# Patient Record
Sex: MALE | Race: BLACK OR AFRICAN AMERICAN | NOT HISPANIC OR LATINO | Employment: OTHER | URBAN - METROPOLITAN AREA
[De-identification: names, ages, dates, MRNs, and addresses within clinical notes are randomized per-mention and may not be internally consistent; named-entity substitution may affect disease eponyms.]

---

## 2019-01-01 ENCOUNTER — TELEPHONE (OUTPATIENT)
Dept: TRANSPLANT | Facility: CLINIC | Age: 54
End: 2019-01-01

## 2019-01-01 ENCOUNTER — HOSPITAL ENCOUNTER (OUTPATIENT)
Dept: RADIOLOGY | Facility: HOSPITAL | Age: 54
Discharge: HOME | End: 2019-08-28
Attending: NURSE PRACTITIONER
Payer: MEDICARE

## 2019-01-01 ENCOUNTER — OFFICE VISIT (OUTPATIENT)
Dept: TRANSPLANT | Facility: CLINIC | Age: 54
End: 2019-01-01
Payer: MEDICARE

## 2019-01-01 ENCOUNTER — CLINICAL SUPPORT (OUTPATIENT)
Dept: INFECTIOUS DISEASES | Facility: CLINIC | Age: 54
End: 2019-01-01
Payer: MEDICARE

## 2019-01-01 VITALS
HEART RATE: 76 BPM | HEIGHT: 72 IN | BODY MASS INDEX: 31.48 KG/M2 | RESPIRATION RATE: 18 BRPM | WEIGHT: 232.38 LBS | TEMPERATURE: 97 F | OXYGEN SATURATION: 100 % | SYSTOLIC BLOOD PRESSURE: 160 MMHG | DIASTOLIC BLOOD PRESSURE: 80 MMHG

## 2019-01-01 DIAGNOSIS — Z76.82 ORGAN TRANSPLANT CANDIDATE: ICD-10-CM

## 2019-01-01 DIAGNOSIS — E08.21 DIABETES MELLITUS DUE TO UNDERLYING CONDITION WITH DIABETIC NEPHROPATHY, UNSPECIFIED WHETHER LONG TERM INSULIN USE: ICD-10-CM

## 2019-01-01 DIAGNOSIS — Z87.39 HX OF OSTEOMYELITIS: ICD-10-CM

## 2019-01-01 DIAGNOSIS — N18.6 ANEMIA IN ESRD (END-STAGE RENAL DISEASE): ICD-10-CM

## 2019-01-01 DIAGNOSIS — Z99.2 ESRD ON HEMODIALYSIS: ICD-10-CM

## 2019-01-01 DIAGNOSIS — N18.6 ESRD ON HEMODIALYSIS: ICD-10-CM

## 2019-01-01 DIAGNOSIS — I73.9 PVD (PERIPHERAL VASCULAR DISEASE): ICD-10-CM

## 2019-01-01 DIAGNOSIS — D63.1 ANEMIA IN ESRD (END-STAGE RENAL DISEASE): ICD-10-CM

## 2019-01-01 DIAGNOSIS — Z01.818 PRE-TRANSPLANT EVALUATION FOR CHRONIC KIDNEY DISEASE: Primary | ICD-10-CM

## 2019-01-01 PROCEDURE — 76770 US RETROPERITONEAL COMPLETE: ICD-10-PCS | Mod: 26,TXP,, | Performed by: RADIOLOGY

## 2019-01-01 PROCEDURE — 99999 PR PBB SHADOW E&M-EST. PATIENT-LVL III: CPT | Mod: PBBFAC,TXP,, | Performed by: NURSE PRACTITIONER

## 2019-01-01 PROCEDURE — 99244 PR OFFICE CONSULTATION,LEVEL IV: ICD-10-PCS | Mod: S$GLB,TXP,, | Performed by: SURGERY

## 2019-01-01 PROCEDURE — 71046 X-RAY EXAM CHEST 2 VIEWS: CPT | Mod: TC,TXP

## 2019-01-01 PROCEDURE — 93978 US DOPP ILIACS BILATERAL: ICD-10-PCS | Mod: 26,TXP,, | Performed by: RADIOLOGY

## 2019-01-01 PROCEDURE — 3008F BODY MASS INDEX DOCD: CPT | Mod: CPTII,S$GLB,TXP, | Performed by: NURSE PRACTITIONER

## 2019-01-01 PROCEDURE — 90471 IMMUNIZATION ADMIN: CPT | Mod: S$GLB,TXP,, | Performed by: PHYSICIAN ASSISTANT

## 2019-01-01 PROCEDURE — 3008F PR BODY MASS INDEX (BMI) DOCUMENTED: ICD-10-PCS | Mod: CPTII,S$GLB,TXP, | Performed by: NURSE PRACTITIONER

## 2019-01-01 PROCEDURE — 72170 XR PELVIS ROUTINE AP: ICD-10-PCS | Mod: 26,TXP,, | Performed by: RADIOLOGY

## 2019-01-01 PROCEDURE — 76770 US EXAM ABDO BACK WALL COMP: CPT | Mod: 26,TXP,, | Performed by: RADIOLOGY

## 2019-01-01 PROCEDURE — 99999 PR PBB SHADOW E&M-EST. PATIENT-LVL III: ICD-10-PCS | Mod: PBBFAC,TXP,, | Performed by: NURSE PRACTITIONER

## 2019-01-01 PROCEDURE — 99205 PR OFFICE/OUTPT VISIT, NEW, LEVL V, 60-74 MIN: ICD-10-PCS | Mod: S$GLB,TXP,, | Performed by: NURSE PRACTITIONER

## 2019-01-01 PROCEDURE — 71046 XR CHEST PA AND LATERAL: ICD-10-PCS | Mod: 26,TXP,, | Performed by: RADIOLOGY

## 2019-01-01 PROCEDURE — 93978 VASCULAR STUDY: CPT | Mod: 26,TXP,, | Performed by: RADIOLOGY

## 2019-01-01 PROCEDURE — 90632 HEPATITIS A VACCINE ADULT IM: ICD-10-PCS | Mod: S$GLB,TXP,, | Performed by: PHYSICIAN ASSISTANT

## 2019-01-01 PROCEDURE — 71046 X-RAY EXAM CHEST 2 VIEWS: CPT | Mod: 26,TXP,, | Performed by: RADIOLOGY

## 2019-01-01 PROCEDURE — 76770 US EXAM ABDO BACK WALL COMP: CPT | Mod: TC,TXP

## 2019-01-01 PROCEDURE — 99205 OFFICE O/P NEW HI 60 MIN: CPT | Mod: S$GLB,TXP,, | Performed by: NURSE PRACTITIONER

## 2019-01-01 PROCEDURE — 72170 X-RAY EXAM OF PELVIS: CPT | Mod: 26,TXP,, | Performed by: RADIOLOGY

## 2019-01-01 PROCEDURE — 72170 X-RAY EXAM OF PELVIS: CPT | Mod: TC,TXP

## 2019-01-01 PROCEDURE — 90632 HEPA VACCINE ADULT IM: CPT | Mod: S$GLB,TXP,, | Performed by: PHYSICIAN ASSISTANT

## 2019-01-01 PROCEDURE — 99244 OFF/OP CNSLTJ NEW/EST MOD 40: CPT | Mod: S$GLB,TXP,, | Performed by: SURGERY

## 2019-01-01 PROCEDURE — 90471 HEPATITIS A VACCINE ADULT IM: ICD-10-PCS | Mod: S$GLB,TXP,, | Performed by: PHYSICIAN ASSISTANT

## 2019-01-01 PROCEDURE — 93978 VASCULAR STUDY: CPT | Mod: TC,TXP

## 2019-01-01 PROCEDURE — 99204 OFFICE O/P NEW MOD 45 MIN: CPT | Mod: S$GLB,TXP,, | Performed by: PHYSICIAN ASSISTANT

## 2019-01-01 PROCEDURE — 99204 PR OFFICE/OUTPT VISIT, NEW, LEVL IV, 45-59 MIN: ICD-10-PCS | Mod: S$GLB,TXP,, | Performed by: PHYSICIAN ASSISTANT

## 2019-01-01 RX ORDER — HYDRALAZINE HYDROCHLORIDE 100 MG/1
100 TABLET, FILM COATED ORAL EVERY 8 HOURS
COMMUNITY

## 2019-01-01 RX ORDER — CARVEDILOL 25 MG/1
25 TABLET ORAL 2 TIMES DAILY WITH MEALS
COMMUNITY

## 2019-01-01 RX ORDER — LOSARTAN POTASSIUM AND HYDROCHLOROTHIAZIDE 25; 100 MG/1; MG/1
1 TABLET ORAL DAILY
COMMUNITY

## 2019-01-01 RX ORDER — INSULIN ASPART 100 [IU]/ML
1-5 INJECTION, SOLUTION INTRAVENOUS; SUBCUTANEOUS
COMMUNITY

## 2019-01-01 RX ORDER — NIFEDIPINE 60 MG/1
30 TABLET, EXTENDED RELEASE ORAL DAILY
COMMUNITY

## 2019-01-01 RX ORDER — FUROSEMIDE 40 MG/1
40 TABLET ORAL DAILY
COMMUNITY

## 2019-01-01 RX ORDER — INSULIN GLARGINE 100 [IU]/ML
20 INJECTION, SOLUTION SUBCUTANEOUS NIGHTLY
COMMUNITY

## 2019-06-29 DIAGNOSIS — Z76.82 ORGAN TRANSPLANT CANDIDATE: Primary | ICD-10-CM

## 2019-07-15 ENCOUNTER — TELEPHONE (OUTPATIENT)
Dept: TRANSPLANT | Facility: CLINIC | Age: 54
End: 2019-07-15

## 2019-07-23 ENCOUNTER — TELEPHONE (OUTPATIENT)
Dept: TRANSPLANT | Facility: CLINIC | Age: 54
End: 2019-07-23

## 2019-08-28 NOTE — PROGRESS NOTES
Transplant Surgery  Kidney Transplant Recipient Evaluation    Referring Physician: Coy Loving  Current Nephrologist: Coy Loving    Subjective:     Reason for Visit: evaluate transplant candidacy    History of Present Illness: Venancio Fraser is a 54 y.o. year old male undergoing transplant evaluation.    Dialysis History: Venancio is on hemodialysis.      Transplant History: N/A    Etiology of Renal Disease: Diabetes Mellitus - Type II (based on medical records from referral).    Review of Systems   Constitutional: Negative for fatigue.   HENT: Negative for drooling, postnasal drip and sore throat.    Eyes: Negative for discharge and itching.   Respiratory: Negative for choking and stridor.    Gastrointestinal: Negative for rectal pain.   Endocrine: Negative for polydipsia.   Genitourinary: Negative for enuresis and genital sores.   Musculoskeletal: Negative for back pain, neck pain and neck stiffness.   Allergic/Immunologic: Negative for immunocompromised state.   Neurological: Negative for facial asymmetry and numbness.   Hematological: Negative for adenopathy.   Psychiatric/Behavioral: Negative for behavioral problems, self-injury and suicidal ideas.       Objective:     Physical Exam:  Constitutional:   Vitals reviewed: yes   Well-nourished and well-groomed: yes  Eyes:   Sclerae icteric: no   Extraocular movements intact: yes  GI:    Bowel sounds normal: yes   Tenderness: no    If yes, quadrant/location: not applicable   Palpable masses: no    If yes, quadrant/location: not applicable   Hepatosplenomegaly: no   Ascites: no   Hernia: no    If yes, type/location: not applicable   Surgical scars: no    If yes, type/location: not applicable  Resp:   Effort normal: yes   Breath sounds normal: yes    CV:   Regular rate and rhythm: yes   Heart sounds normal: yes   Femoral pulses normal: yes   Extremities edematous: no  Skin:   Rashes or lesions present: no    If yes, describe:not applicable   Jaundice::  no    Musculoskeletal:   Gait normal: yes   Strength normal: yes  Psych:   Oriented to person, place, and time: yes   Affect and mood normal: yes    Additional comments: not applicable    Counseling: We provided Venancio Fraser with a group education session today.  We discussed kidney transplantation at length with him, including risks, potential complications, and alternatives in the management of his renal failure.  The discussion included complications related to anesthesia, bleeding, infection, primary nonfunction, and ATN.  I discussed the typical postoperative course, length of hospitalization, the need for long-term immunosuppression, and the need for long-term routine follow-up.  I discussed living-donor and -donor transplantation and the relative advantages and disadvantages of each.  I also discussed average waiting times for both living donation and  donation.  I discussed national and center-specific survival rates.  I also mentioned the potential benefit of multicenter listing to candidates listed with centers within more than one organ procurement organization.  All questions were answered.    Final determination of transplant candidacy will be made once evaluation is complete and reviewed by the Kidney & Kidney/Pancreas Selection Committee.         Transplant Surgery - Candidacy   Assessment/Plan:   Venancio Fraser has end stage renal disease (ESRD) on dialysis. I see no surgical contraindication to placing a kidney transplant. Based on available information, Venancio Fraser is a suitable kidney transplant candidate.     Telly Kiran MD

## 2019-08-28 NOTE — PROGRESS NOTES
PHARM.D. PRE-TRANSPLANT NOTE:    This patient's medication therapy was evaluated as part of his pre-transplant evaluation.      The following general pharmacologic concerns were noted: none     The following pharmacologic concerns related to HCV therapy were noted: none    This patient's medication profile was reviewed for contraindications for DAA Hepatitis C therapy:    [x]  No current inducers of CYP 3A4 or PGP  [x]  No amiodarone on this patient's EMR profile in the last 24 months  [x]  No past or current atrial fibrillation on this patient's EMR profile       Current Outpatient Medications   Medication Sig Dispense Refill    carvedilol (COREG) 25 MG tablet Take 25 mg by mouth 2 (two) times daily with meals.      furosemide (LASIX) 40 MG tablet Take 40 mg by mouth once daily.      hydrALAZINE (APRESOLINE) 100 MG tablet Take 100 mg by mouth every 8 (eight) hours.      insulin (LANTUS SOLOSTAR U-100 INSULIN) glargine 100 units/mL (3mL) SubQ pen Inject 20 Units into the skin every evening.      insulin aspart U-100 (NOVOLOG FLEXPEN U-100 INSULIN) 100 unit/mL (3 mL) InPn pen Inject 1-5 Units into the skin 3 (three) times daily with meals. Per sliding scale      losartan-hydrochlorothiazide 100-25 mg (HYZAAR) 100-25 mg per tablet Take 1 tablet by mouth once daily.      NIFEdipine (ADALAT CC) 60 MG TbSR Take 30 mg by mouth once daily.       No current facility-administered medications for this visit.        Currently he is responsible for preparing / administering this patient's medications on a daily basis.  I am available for consultation and can be contacted, as needed by the other members of the Kidney Transplant team.

## 2019-08-28 NOTE — PROGRESS NOTES
Transplant Nephrology  Kidney Transplant Recipient Evaluation    Referring Physician: Coy Loving  Current Nephrologist: Coy Loving    Subjective:   CC:  Initial evaluation of kidney transplant candidacy.    HPI:  Mr. Fraser is a 54 y.o. year old Black or  male who has presented to be evaluated as a potential kidney transplant recipient.  He has ESRD secondary to diabetic nephropathy and HTN.  Patient is currently on hemodialysis started on 4/2019. Patient is dialyzing on MWF schedule.  Patient reports that he is tolerating dialysis well.. He has a LUE AV fistula for dialysis access.     Previous Transplant: no    Past Medical and Surgical History: Mr. Fraser  has a past medical history of Acute interstitial nephritis, Anemia, Diabetes mellitus, type 2, Diabetic foot ulcer, Diabetic foot ulcer, Diabetic retinopathy, Disorder of kidney and ureter, Hypertension, Obesity, Osteomyelitis, PVD (peripheral vascular disease), and Sleep apnea.  He has a past surgical history that includes toe amputation; Eye surgery; and Cataract extraction, bilateral.    Past Social and Family History: Mr. Fraser  His family history includes Aneurysm in his sister; Breast cancer in his mother; Cancer in his mother; Hypertension in his brother, brother, father, and mother; Kidney disease in his brother and brother.    DM 2 since 1999, on insulin  Peripheral neuropathy, retinopathy    HX diabetic foot ulcers, osteomyelitis , s/p right toe amputation  Pt Denies open wounds/ active foot ulcers  . ( care every where) Last seen by wound care in 7/10/2019 with active ulcer--> f/u apt scheduled 1 week later that was no showed.     Hemoglobin A1C   Date Value Ref Range Status   08/28/2019 6.1 (H) 4.0 - 5.6 % Final     Comment:             Runs a non profit for [employer]   Walks ~ 1/2-1 mile a day.   Denies SOB, chest pain or leg pain with exertion    Kidney bx-no  Donor -no      Past Medical History:   Diagnosis  "Date    Acute interstitial nephritis     Anemia     Diabetes mellitus, type 2     Diabetic foot ulcer     Diabetic foot ulcer     Diabetic retinopathy     Disorder of kidney and ureter     Hypertension     Obesity     Osteomyelitis     PVD (peripheral vascular disease)     Sleep apnea        Review of Systems   Constitutional: Positive for fatigue. Negative for activity change, appetite change, chills, fever and unexpected weight change.   HENT: Negative for congestion, facial swelling, postnasal drip, rhinorrhea, sinus pressure, sore throat and trouble swallowing.    Eyes: Positive for visual disturbance. Negative for pain and redness.        Diabetic retinopathy    Respiratory: Negative for cough, chest tightness, shortness of breath and wheezing.    Cardiovascular: Negative.  Negative for chest pain, palpitations and leg swelling.   Gastrointestinal: Negative for abdominal pain, diarrhea, nausea and vomiting.   Genitourinary: Negative for dysuria, flank pain and urgency.        Makes ~ 500 cc / 24 hours    Musculoskeletal: Negative for gait problem, neck pain and neck stiffness.   Skin: Negative for rash.   Allergic/Immunologic: Negative for environmental allergies, food allergies and immunocompromised state.   Neurological: Negative for dizziness, weakness, light-headedness and headaches.        Peripheral neuropathy    Psychiatric/Behavioral: Negative for agitation and confusion. The patient is not nervous/anxious.        Objective:   Blood pressure (!) 160/80, pulse 76, temperature 97.4 °F (36.3 °C), temperature source Oral, resp. rate 18, height 6' 0.28" (1.836 m), weight 105.4 kg (232 lb 5.8 oz), SpO2 100 %.body mass index is 31.27 kg/m².    Physical Exam   Constitutional: He is oriented to person, place, and time. He appears well-developed and well-nourished.   HENT:   Head: Normocephalic.   Mouth/Throat: Oropharynx is clear and moist. No oropharyngeal exudate.   Eyes: Pupils are equal, round, " and reactive to light. Conjunctivae and EOM are normal. No scleral icterus.   Neck: Normal range of motion. Neck supple.   Cardiovascular: Normal rate, regular rhythm and normal heart sounds.   Pulmonary/Chest: Effort normal and breath sounds normal.   Abdominal: Soft. Normal appearance and bowel sounds are normal. He exhibits no distension and no mass. There is no splenomegaly or hepatomegaly. There is no tenderness. There is no rebound, no guarding, no CVA tenderness, no tenderness at McBurney's point and negative Jhaveri's sign.       Musculoskeletal: Normal range of motion. He exhibits no edema.   Lymphadenopathy:     He has no cervical adenopathy.   Neurological: He is alert and oriented to person, place, and time. He exhibits normal muscle tone. Coordination normal.   Skin: Skin is warm and dry.   Psychiatric: He has a normal mood and affect. His behavior is normal.   Vitals reviewed.      Labs:  Lab Results   Component Value Date    WBC 6.02 08/28/2019    HGB 10.8 (L) 08/28/2019    HCT 34.4 (L) 08/28/2019     08/28/2019    K 3.6 08/28/2019    CL 98 08/28/2019    CO2 32 (H) 08/28/2019    BUN 33 (H) 08/28/2019    CREATININE 11.0 (H) 08/28/2019    EGFRNONAA 4.6 (A) 08/28/2019    CALCIUM 9.1 08/28/2019    PHOS 5.5 (H) 08/28/2019    ALBUMIN 3.8 08/28/2019    AST 21 08/28/2019    ALT 9 (L) 08/28/2019    .0 (H) 08/28/2019       No results found for: PREALBUMIN, BILIRUBINUA, GGT, AMYLASE, LIPASE, PROTEINUA, NITRITE, RBCUA, WBCUA    No results found for: HLAABCTYPE    Labs were reviewed with the patient.    Assessment:     1. Pre-transplant evaluation for chronic kidney disease    2. ESRD on hemodialysis    3. Anemia in ESRD (end-stage renal disease)    4. Diabetes mellitus due to underlying condition with diabetic nephropathy, unspecified whether long term insulin use    5. PVD (peripheral vascular disease)    6. Hx of osteomyelitis        Plan:   clearance from Pt's wound care clinic that all diabetic  foot ulcers are healed and resolved.     Transplant Candidacy:   Based on available information, Mr. Fraser is a suitable kidney transplant candidate.   Meets center eligibility for accepting HCV+ donor offer - yes.  Patient educated on HCV+ donors. Venancio is willing to accept HCV+ donor offer - yes   Patient is a candidate for KDPI > 85 kidney donor offer - no d/t weight.  Final determination of transplant candidacy will be made once workup is complete and reviewed by the selection committee.    Mare Turner NP       OS Patient Status  Functional Status: 60% - Requires occasional assistance but is able to care for needs  Physical Capacity: No Limitations

## 2019-08-28 NOTE — LETTER
August 29, 2019        Coy Loving  [address] [address]  Phone: [phone]  Fax: [phone]             [facility]  [address]  [facility] [address]  Phone: [phone]   Patient: Venancio Fraser   MR Number: [id number]   YOB: 1965   Date of Visit: 8/28/2019       Dear Dr. Coy Loving    Thank you for referring Venancio Fraser to me for evaluation. Attached you will find relevant portions of my assessment and plan of care.    If you have questions, please do not hesitate to call me. I look forward to following Venancio Fraser along with you.    Sincerely,    Mare Turner, NP    Enclosure    If you would like to receive this communication electronically, please contact [email or web address] or [phone] to request [employer] Link access.    [employer] Link is a tool which provides read-only access to select patient information with whom you have a relationship. Its easy to use and provides real time access to review your patients record including encounter summaries, notes, results, and demographic information.    If you feel you have received this communication in error or would no longer like to receive these types of communications, please e-mail [email or web address]

## 2019-08-28 NOTE — PROGRESS NOTES
INITIAL PATIENT EDUCATION NOTE    Mr. Venancio Fraser was seen in pre-kidney transplant clinic for evaluation for kidney, kidney/pancreas or pancreas only transplant.  The patient attended a group education session that discussed/reviewed the following aspects of transplantation: evaluation and selection committee process, UNOS waitlist management/multiple listings, types of organs offered (KDPI < 85%, KDPI > 85%, PHS increased risk, DCD, HCV+), financial aspects, surgical procedures, dietary instruction pre- and post-transplant, health maintenance pre- and post-transplant, post-transplant hospitalization and outpatient follow-up, potential to participate in a research protocol, and medication management and side effects.  A question and answer session was provided after the presentation.    The patient was seen by all members of the multi-disciplinary team to include: Nephrologist/PA, Surgeon, , Transplant Coordinator, , Pharmacist and Dietician (if applicable).    The patient reviewed and signed all consents for evaluation which were witnessed and sent to scanning into the EPIC chart.    The patient was given an education book and plan for further evaluation based on his individual assessment.      The patient was encouraged to call with any questions or concerns.

## 2019-08-28 NOTE — PROGRESS NOTES
Transplant Recipient Adult Psychosocial Assessment    Venancio Fraser  [address]  [facility] [address]  Telephone Information:   Mobile [phone]   Home  [phone] (home)  Work  There is no work phone number on file.  E-mail  No e-mail address on record    Sex: male  YOB: 1965  Age: 54 y.o.    Encounter Date: 2019  [place] Citizen: yes  Primary Language: English   Needed: no   Transportation: pt reports does drive self/own car    Emergency Contact:  Jules Fraser, 62 yo wife, [facility], does drive/own car, works as  and consulting. [phone]    Family/Social Support:   Number of dependents/: pt denies  Marital history:  2 x. 1st marriage . 2nd marriage to Jules 5 years. They live apart due to wife wanting to work for [employer]  Other family dynamics: Pt reports both parents . Pt reports lives alone in [facility]. Pt reports wife lives in [place] due to work. Pt reports having 3 grown biological children -- 2 children live in [facility] and 1 lives in [facility]. Pt reports supportive sister Madiha Garcia lives in [facility] and is supportive. Pt's sister Madiha with patient for pre transplant evaluation and reports will be patient's primary transplant caregiver with wife and pt's children as back up transplant caregivers.    Household Composition:  Pt reports lives alone in [facility].    Do you and your caregivers have access to reliable transportation? yes  PRIMARY CAREGIVER: Madiha Garcia, sister, will be primary caregiver, phone number [phone]      provided in-depth information to patient and caregiver regarding pre- and post-transplant caregiver role.   strongly encourages patient and caregiver to have concrete plan regarding post-transplant care giving, including back-up caregiver(s) to ensure care giving needs are met as needed.    Patient and Caregiver states  understanding all aspects of caregiver role/commitment and is able/willing/committed to being caregiver to the fullest extent necessary.    Patient and Caregiver verbalizes understanding of the education provided today and caregiver responsibilities.         remains available. Patient and Caregiver agree to contact  in a timely manner if concerns arise.      Able to take time off work without financial concerns: yes.     Additional Significant Others who will Assist with Transplant:  Adan Fraser, 29 yo son, Adela MATIAS, does drive/own car. [phone]  Zuleyka Fraser, 27 yo daughter, [place], does drive/own car. [phone]  Jules Fraser, 62 yo wife, [facility], does drive/own car, works as  and consulting. [phone]    Living Will: no  Healthcare Power of : no  Advance Directives on file: <<no information> per medical record.  Verbally reviewed LW/HCPA information.   provided patient with copy of LW/HCPA documents and provided education on completion of forms.    Living Donors: Education and resource information given to patient.    Highest Education Level: Associate/Bachelor Degree coaching. Former  1 year with [employer]  Reading Ability: college  Reports difficulty with: seeing wears glasses  Learns Best By:  Reading about, seeing and doing new task until proficient     Status: no  VA Benefits: no     Working for Income: No  If no, reason not working: Disability  Patient is 1 year with [employer] ([place]), years  at [employer], and years as .    Spouse/Significant Other Employment:  and consultant    Disabled: yes due to ESRD. Pt reports also suffers from diabetes and had toe amputations.    Monthly Income:  $1350 from [employer] disability  Able to afford all costs now and if transplanted, including medications: yes  Patient and Caregiver verbalizes understanding of personal  responsibilities related to transplant costs and the importance of having a financial plan to ensure that patients transplant costs are fully covered.       provided fundraising information/education. Patient and Caregiververbalizes understanding.   remains available.    Insurance:   Payor/Plan Subscr  Sex Relation Sub. Ins. ID Effective Group Num   1. [employer] MANAGE* GRACIELA HALL 1965 Male  [id number]                                   [address] [address]     Primary Insurance (for UNOS reporting): Public Insurance - Medicare & Choice  Secondary Insurance (for UNOS reporting): None  Patient and Caregiver verbalizes clear understanding that patient may experience difficulty obtaining and/or be denied insurance coverage post-surgery. This includes and is not limited to disability insurance, life insurance, health insurance, burial insurance, long term care insurance, and other insurances.      Patient and Caregiver also reports understanding that future health concerns related to or unrelated to transplantation may not be covered by patient's insurance.  Resources and information provided and reviewed.     Patient and Caregiver provides verbal permission to release any necessary information to outside resources for patient care and discharge planning.  Resources and information provided are reviewed.      Pt reports in [facility] ([place] for one year. Pt reports hope that [employer] [facility] [employer] has some benefits he can apply for -- pt reports will investigate.    [facility] in [facility], tel:                    .  Hemodialysis: Tues, Thurs, Sat for 4 hours.    Dialysis Adherence: Patient and Caregiver reports high dialysis compliance within last 3 months.  Dialysis compliance update needed and form sent to unit for completion.    Infusion Service: patient utilizing? no  Home Health: patient utilizing? no  DME: yes bp cuff, scale and glucometer  Pulmonary/Cardiac Rehab: pt denies  ADLS:   Independent with medication management, self care and driving    Adherence:   Pt reports high compliance with all medical appointments and instructions. Adherence education and counseling provided.     Per History Section:  Past Medical History:   Diagnosis Date    Acute interstitial nephritis     Anemia     Diabetes mellitus, type 2     Diabetic foot ulcer     Diabetic foot ulcer     Diabetic retinopathy     Disorder of kidney and ureter     Hypertension     Obesity     Osteomyelitis     PVD (peripheral vascular disease)     Sleep apnea      Social History     Tobacco Use    Smoking status: Never Smoker    Smokeless tobacco: Never Used   Substance Use Topics    Alcohol use: Not Currently     Social History     Substance and Sexual Activity   Drug Use Never     Social History     Substance and Sexual Activity   Sexual Activity Not on file       Per Today's Psychosocial:  Please review above table for pt's reported substance use history.    Patient and Caregiver states clear understanding of the potential impact of substance use as it relates to transplant candidacy and is aware of possible random substance screening.  Substance abstinence/cessation counseling, education and resources provided and reviewed.     Arrests/DWI/Treatment/Rehab: patient denies    Psychiatric History:    Mental Health: pt denies any mental health history or current concerns. Pt denies need for mental health referral at this time.  Psychiatrist/Counselor: pt denies  Medications:  Pt denies  Suicide/Homicide Issues: pt denies any si/hi history   Safety at home: pt reports living in safe home environment.    Knowledge: Patient and Caregiver states having clear understanding and realistic expectations regarding the potential risks and potential benefits of organ transplantation and organ donation and agrees to discuss with health care team members and support system members, as well as to utilize available resources and express  questions and/or concerns in order to further facilitate the pt informed decision-making.  Resources and information provided and reviewed.    Patient and Caregiver is aware of [facility]'s affiliation and/or partnership with agencies in home health care, LTAC, SNF, [facility], [facility], and other hospitals and clinics.    Understanding: Patient and Caregiver reports having a clear understanding of the many lifetime commitments involved with being a transplant recipient, including costs, compliance, medications, lab work, procedures, appointments, concrete and financial planning, preparedness, timely and appropriate communication of concerns, abstinence (ETOH, tobacco, illicit non-prescribed drugs), adherence to all health care team recommendations, support system and caregiver involvement, appropriate and timely resource utilization and follow-through, mental health counseling as needed/recommended, and patient and caregiver responsibilities.  Social Service Handbook, resources and detailed educational information provided and reviewed.  Educational information provided.    Patient and Caregiver also reports current and expected compliance with health care regime and states having a clear understanding of the importance of compliance.      Patient and Caregiver reports a clear understanding that risks and benefits may be involved with organ transplantation and with organ donation.       Patient and Caregiver also reports clear understanding that psychosocial risk factors may affect patient, and include but are not limited to feelings of depression, generalized anxiety, anxiety regarding dependence on others, post traumatic stress disorder, feelings of guilt and other emotional and/or mental concerns, and/or exacerbation of existing mental health concerns.  Detailed resources provided and discussed.      Patient and Caregiver agrees to access appropriate resources in a timely manner as needed and/or as recommended, and to communicate  concerns appropriately.  Patient and Caregiver also reports a clear understanding of treatment options available.     Patient and Caregiver received education in a group setting.   reviewed education, provided additional information, and answered questions.    Feelings or Concerns: Pt reports high motivation to pursue organ transplant.    Coping: Pt reports is coping well overall with kidney problems and need for organ transplant. Pt reports telma best by staying engaged with family and friends. 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    Goals: Pt reports hope for successful organ transplant so he may discontinue dialysis. Patient referred to Vocational Rehabilitation.    Interview Behavior: Patient and Caregiver presents as alert and oriented x 4, pleasant, good eye contact, well groomed, recall good, concentration/judgement good, average intelligence, calm, communicative, cooperative and asking and answering questions appropriately. Pt's sister Madiha in pre transplant appointments with pt's permission.         Transplant Social Work - Candidacy  Assessment/Plan:     Psychosocial Suitability: Patient presents as a suitable candidate for kidney transplant at this time. Based on psychosocial risk factors, patient presents as low risk, due to patient denying any psychosocial barriers to organ transplant. Pt reports having organ transplant caregiver/transportation plan, medical insurance plan and plan to afford transplant costs all in place. Pt reports high in-center hemodialysis compliance.    Recommendations/Additional Comments: Dialysis compliance update needed and form sent to unit for completion. Pt reports some caregivers work and may need employer paperwork completed for any time missed due to transplant. Pt reports lives away and will need transplant lodging; lodging was reviewed and discussed in detail. Pt reports belief insurance will cover transplant meals, lodging and  travel costs; pt to confirm with insurance company of these benefits. 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     Final determination of transplant candidacy will be made once work up is complete and reviewed by the selection committee.    Kiersten Reed MSW LCSW

## 2019-08-28 NOTE — PROGRESS NOTES
Pre Transplant Infectious Diseases Consult  Kidney Transplant Recipient Evaluation    Requesting Physician: Coy Su    Reason for Visit:    Chief Complaint   Patient presents with    Kidney Transplant Pre-evaluation     History of Present Illness  Venancio Fraser is a 54 y.o. year old Black or  male with advanced Kidney disease currently being evaluated for Kidney transplant.  He has been on HD x 4 months. Patient denies any recent fever, chills, or infective illnesses.      1) Do you have a history of:   YES NO   Diabetes      [x] []     Diabetic Foot Infection/Bone Infection  [x]        [] 1) Amputation R [id number] treated with IV and foot sx - no recurrence 2) Oct 2018 R foot plantar 5th metatarsal - Tx sx and IV abx - no recurrnce    Surgical Removal of Spleen   []  [x]                  2) Have you had recurrent infections involving:             YES NO  Sinus infections  []         [x]   Sore Throat   []         [x]                 Prostate Infections  []         [x]              Bladder Infections  []         [x]                     Kidney Infections  []         [x]                               Intestinal Infections  []         [x]      Skin Infections   []         [x]       Reproductive Infections          []  [x]   Periodontal Disease  []         [x]        3)Have you ever had: YES     NO UNKNOWN      Chicken Pox   [x]         []  []   Shingles   [x]         []  []   Orolabial Herpes             [x]  []  []   Genital Herpes  []         [x]  []   Cytomegalovirus  []         [x]  []   Rosalina-Barr Virus  []         [x]  []   Genital Warts   []         [x]  []   Hepatitis A   []         [x]  []   Hepatitis B   []         [x]  []   Hepatitis C   []         [x]  []   Syphilis   []         [x]  []   Gonorrhea   [x]         []  [] Treated 1983 no recurrence  Pelvic Inflammatory   Disease   []         [x]  []   Chlamydia Infection  []         [x]  []   Intestinal parasites   or worms   []          [x]  []   Fungal Infections  []         [x]  []   Blood Infections  []         [x]  []       Comment:       4) Have you ever been exposed   YES NO  To someone with tuberculosis?  []   [x]   If yes, what treatment did you receive:     5) What states have you lived in? [place], [place], [place], [place]    6) What countries have you visited for more than 2 weeks?   None                      YES NO  7) Did you have any associated infections?  []  [x]       8) Are you planning to travel outside the    [x]  []   [place] after your transplant?    9) Household                   YES NO  Do you have pets living in your house?    []         [x]   If yes, describe:     Do you spend time or live on a farm or     []         [x]   have livestock or other farm animals?  If yes, which ones:    Do you have a fish tank?          []  [x]       Do you have a litter box?      []         [x]     Do you fish or hunt?       []         [x]     Do you clean or skin fish or animals?    []         [x]     Do you consume raw or undercooked    []         [x]   meat, fish, or shellfish?      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          12)Do you garden or otherwise  YES NO   work in the soil?    []         [x]   13)Do you hike, camp, or spend     time in wooded areas?   [x]         []        14) The patient's immunization history was reviewed.    Have you ever received:  YES NO UNKNOWN DATES   Routine Childhood vaccines  [x]         []  []      Influenza vaccine   [x]  []  [] 2018-19   Prevnar-13/Pneumovax  [x]  []  [] May 2019/2017    Tetanus-diptheria   [x]         []  [] Oct 2018   Hepatitis A vaccine series       []  [x]  []    Hepatitis B vaccine series         [x]  []  [] He suspects   Meningitis vaccine   []         [x]  []    Varicella vaccine   []         [x]  []        Based on the patients immunization history and serologies, immunizations were ordered:         Ordered  Not  Ordered  Influenza Vaccine     []    [x]   Hepatitis A series at 0,  6 months   [x]    []   Hepatitis B seriesat 0, 1, and 6 months  []    [x]   Hepatitis B High Dose 0,1, and 6 months  []    [x]   Prevnar      []    [x]   Pneumovax      []    [x]    TDap       []    [x]    Zoster       []    [x]   Menactra      []    []            The patient was encouraged to contact us about any problems that may develop after immunization and possible side effects were reviewed.      Previous Transplant: no    Etiology of Kidney Disease: diabetic nephropathy and HTN    Allergies: Zosyn [piperacillin-tazobactam]    There is no immunization history on file for this patient.  Past Medical History:   Diagnosis Date    Acute interstitial nephritis     Anemia     Diabetes mellitus, type 2     Diabetic foot ulcer     Diabetic foot ulcer     Diabetic retinopathy     Disorder of kidney and ureter     Hypertension     Obesity     Osteomyelitis     PVD (peripheral vascular disease)     Sleep apnea      Past Surgical History:   Procedure Laterality Date    AV FISTULA PLACEMENT      CATARACT EXTRACTION, BILATERAL      EYE SURGERY      toe amputation        Social History     Socioeconomic History    Marital status:      Spouse name: Not on file    Number of children: Not on file    Years of education: Not on file    Highest education level: Not on file   Occupational History    Not on file   Social Needs    Financial resource strain: Not on file    Food insecurity:     Worry: Not on file     Inability: Not on file    Transportation needs:     Medical: Not on file     Non-medical: Not on file   Tobacco Use    Smoking status: Never Smoker    Smokeless tobacco: Never Used   Substance and Sexual Activity    Alcohol use: Not Currently    Drug use: Never    Sexual activity: Not on file   Lifestyle    Physical activity:     Days per week: Not on file     Minutes per session: Not on file    Stress: Not on file    Relationships    Social connections:     Talks on phone: Not on file     Gets together: Not on file     Attends Pentecostal service: Not on file     Active member of club or organization: Not on file     Attends meetings of clubs or organizations: Not on file     Relationship status: Not on file   Other Topics Concern    Not on file   Social History Narrative    Not on file       Review of Systems   Constitution: Negative for chills, decreased appetite, fever, malaise/fatigue, night sweats, weight gain and weight loss.   HENT: Negative for congestion, ear pain, hearing loss, hoarse voice, sore throat and tinnitus.    Eyes: Negative for blurred vision, redness and visual disturbance.   Cardiovascular: Negative for chest pain, leg swelling and palpitations.   Respiratory: Negative for cough, hemoptysis, shortness of breath, sputum production and wheezing.    Endocrine: Negative for cold intolerance and heat intolerance.   Hematologic/Lymphatic: Negative for adenopathy. Does not bruise/bleed easily.   Skin: Negative for dry skin, itching, rash and suspicious lesions.   Musculoskeletal: Negative for back pain, joint pain, myalgias and neck pain.   Gastrointestinal: Negative for abdominal pain, constipation, diarrhea, heartburn, nausea and vomiting.   Genitourinary: Negative for dysuria, flank pain, frequency, hematuria, hesitancy and urgency.   Neurological: Negative for dizziness, headaches, numbness, paresthesias and weakness.   Psychiatric/Behavioral: Negative for depression and memory loss. The patient does not have insomnia and is not nervous/anxious.    Allergic/Immunologic: Negative for environmental allergies, HIV exposure, hives and persistent infections.     Physical Exam   Constitutional: He is oriented to person, place, and time. He appears well-developed and well-nourished.       HENT:   Head: Normocephalic and atraumatic.   Mouth/Throat: Uvula is midline, oropharynx is clear and moist and mucous  membranes are normal. He does not have dentures. No oral lesions. Abnormal dentition (some missing and cavitary eroded teeth). Dental caries present. No dental abscesses or lacerations.   Eyes: Pupils are equal, round, and reactive to light. Conjunctivae and lids are normal. No scleral icterus.   Neck: Neck supple.   Cardiovascular: Normal rate and regular rhythm. Exam reveals no gallop and no friction rub.   No murmur heard.  Pulmonary/Chest: Effort normal and breath sounds normal. No respiratory distress. He has no decreased breath sounds. He has no wheezes. He has no rhonchi. He has no rales.   Abdominal: Soft. Normal appearance, normal aorta and bowel sounds are normal. He exhibits no distension and no mass. There is no hepatosplenomegaly. There is no tenderness. There is no rebound and no guarding.   Musculoskeletal: He exhibits no edema.   Lymphadenopathy:        Head (right side): No submental, no submandibular, no tonsillar, no preauricular, no posterior auricular and no occipital adenopathy present.        Head (left side): No submental, no submandibular, no tonsillar, no preauricular, no posterior auricular and no occipital adenopathy present.     He has no cervical adenopathy.     He has no axillary adenopathy.        Right: No inguinal, no supraclavicular and no epitrochlear adenopathy present.        Left: No inguinal, no supraclavicular and no epitrochlear adenopathy present.   Neurological: He is alert and oriented to person, place, and time. No cranial nerve deficit.   Skin: Skin is warm, dry and intact. No lesion and no rash noted. He is not diaphoretic. No erythema. No pallor.   Psychiatric: He has a normal mood and affect. His behavior is normal.         Diagnostics: No results found for: RPR  No results found for: CMVANTIBODIE  No results found for: HIV1X2  No results found for: HTLVIIIANTIB  No results found for: HEPBSAG  No results found for: HEPBCAB  No results found for: HEPCAB  No results  found for: TOXOIGG  No components found for: TOXOIGGINTER  No results found for: ZOR5YVD  No results found for: HRS3SMR  No results found for: VARICELLAZOS  No results found for: VARICELLAINT  No results found for: STRONGANTIGG  No results found for: EPSTEINBARRV  No results found for: HEPBSAB  No results found for: QUANTIFERON  No results found for: HEPAIGM  No results found for: PPD  No results found for this or any previous visit.         Transplant Infectious Diseases - Candidacy    Assessment/Plan:     Transplant Candidacy: Based on available information, there are no identified significant barriers to transplantation from an infectious disease standpoint pending acceptable serologies. Refer to ID clinic for any serologies that require further evaluation. It is recommended the patient have their teeth treated prior to transplant.  Rec patient fu with his podiatrist for further evaluation of R foot callus, history of osteomyelitis, and clear of infection prior to transplant.       Final determination of transplant candidacy will be made once evaluation is complete and reviewed by the Transplant Selection Committee.    MANOHAR Burton  Vaccine and Serology needs:  1. Hep A today and 6 months  2. Shingrix at pharmacy  3. Hep B at dialysis if not yet given  4. Flu vaccine at dialysis  - Rx given for fu vaccines       Counseling:I discussed with Venancio the risk for increased susceptibility to infections following transplantation including increased risk for infection right after transplant and if rejection should occur.  The patients has been counseled on the importance of vaccinations including but not limited to a yearly flu vaccine.  Specific guidance has been provided to the patient regarding the patients occupation, hobbies and activities to avoid future infectious complications including but not limited to avoiding undercooked meats and seafood, proper hygiene, and contact with animals.

## 2019-08-28 NOTE — TELEPHONE ENCOUNTER
Reviewed pt transplant labs.  Notified dialysis unit dietitian of the following abnormal labs via fax and requested their most recent nutrition note on this pt.  Once this note is received it will be scanned into pt's chart.    Ha1c 6.1  Phos 5.5

## 2019-09-04 NOTE — TELEPHONE ENCOUNTER
I called Mr Craft to get dialysis unit information. I got voice mail and left a message to get that info.

## 2019-10-18 NOTE — TELEPHONE ENCOUNTER
Patient informed that we received cards clearance and nuclear stress test. No echocardiogram results received. Patient stated that he completed an echo also but [facility] stated that he didn't. Patient instructed to get results to me or call to reschedule another echo. Patient also stated that he's seeing an Infectious Doctor on Monday. Patient stated that he has my contact information. All questions were answered and pt verbalized understanding.

## 2019-11-12 NOTE — TELEPHONE ENCOUNTER
Chart reviewed. Patient informed that he's pending ID consult for + TB Quant. Patient stated that he has an appointment with his PCP on tomorrow. He will forward results to me or let me know if if PCP will refer him for treatment.   All questions were answered and patient verbalized understanding.   ----- Message from Madiha Brown sent at 11/8/2019  5:08 PM CST -----      ----- Message -----  From: Tanvi Medeiros  Sent: 11/8/2019   3:37 PM CST  To: [facility] Pre-Kidney Transplant Clinical    Pt passed echo and wants to know if results were forwarded to [facility] and to get information on non profit emailed and postal mailed to him @: [email or web address]    pls contact pt to discuss other issues as well     Pt contact [phone]

## 2019-12-11 NOTE — TELEPHONE ENCOUNTER
Chart reviewed for selection committee. Patient is still outstanding for [place] clearance for +TB quant. Patient stated that he has appointment on 12/16/19 but did have the doctor's name or number available. Patient instructed to call me with the information so I can request clearance once he's seen by provide. Patient verbalized understanding.

## 2019-12-12 NOTE — TELEPHONE ENCOUNTER
----- Message from Tanvi Medeiros sent at 12/12/2019  9:26 AM CST -----  Pt calling to speak w/coordinator regarding his TB testing appt on 12/16/19 w/Dr. Delonte Harrison [phone]    Pt contact [phone]

## 2020-01-01 ENCOUNTER — TELEPHONE (OUTPATIENT)
Dept: TRANSPLANT | Facility: CLINIC | Age: 55
End: 2020-01-01

## 2020-01-01 ENCOUNTER — COMMITTEE REVIEW (OUTPATIENT)
Dept: TRANSPLANT | Facility: CLINIC | Age: 55
End: 2020-01-01

## 2020-01-24 NOTE — TELEPHONE ENCOUNTER
----- Message from Tnia Velásquez sent at 1/24/2020  9:25 AM CST -----  Contact: pt  Patient calling to speak with coordinator       Call Back : [phone]

## 2020-02-21 NOTE — LETTER
February 21, 2020    Coy Loving MD  [address] [address]     Dear Dr. Loving    Patient: Venancio Fraser   MR Number: [id number]   YOB: 1965     Your patient, Venancio Fraser, was recently discussed at the [facility]ney Selection Committee.  I am happy to inform you that Venancio has been approved for transplantation pending neurology clearance secondary to possible seizure activity.  He has met selection criteria for a kidney transplant related to ESRD secondary to primary diagnosis of Diabetes Mellitus - Type II. Your patient will be placed on the cadaveric wait list pending final financial approval from insurance company.     We appreciate your confidence in allowing us to participate in your patients care.      If you have any questions or concerns, please do not hesitate to contact me.    Sincerely,    Yanin Hernandez MD  Medical Director, Kidney & Kidney/Pancreas Transplantation  /[place]

## 2020-02-21 NOTE — COMMITTEE REVIEW
Native Organ Dx: Diabetes Mellitus - Type II      SELECTION COMMITTEE NOTE    Venancio Fraser was presented at selection committee on 2/21/2020.  Patient met selection criteria for kidney transplant related to ESRD due to  Diabetes Mellitus - Type II.  No absolute contraindications to transplant at this time.  Patient will be placed on the cadaveric wait list pending neurology clearance secondary to possible seizure activity and final financial approval from insurance company.  Patient will return to clinic for routine appointment in 1 year(s). Patient does not meet criteria for High KDPI kidney offer due to weight.  Patient does meet HCV+ donor offer. Patient does not meet criteria for dual/enbloc, due to weight.    Patient was admitted from 2/11/20 to 2/16/20 with AMS and workup revealed abnormal EEG and seizure disorder is listed on discharge summary. His medication list on discharge summary also listed Keppra. He will need updated listed RR clinic visit in August 2020.     Spoken to patient in regards to committee's decision. All questions were answered and patient verbalized.     Note written by Keiko Shah RN    ===============================================      I was present at the meeting and attest to the decision of the committee. My additional comments are bolded above.    Pati Dumont MD

## 2020-06-12 NOTE — TELEPHONE ENCOUNTER
Call return, voicemail full.   ----- Message from Madiha Brown sent at 6/11/2020  5:03 PM CDT -----  Contact: pt      ----- Message -----  From: Tina Velásquez  Sent: 6/11/2020   4:46 PM CDT  To: [facility] Pre-Kidney Transplant Clinical    Patient calling to speak with coordinator         Call Back : [phone]

## 2020-06-16 NOTE — TELEPHONE ENCOUNTER
Attempted call to patient, no answer, left message on voicemail. Records received from pt's hospitalization when he was hospitalization for Myoclonic jerks. I wanted to informed patient that he will still need to follow up with his neurologist as outpatient for clearance to undergo kidney transplantation. This is not clearance.      ----- Message from Madiha Brown sent at 6/15/2020  6:59 PM CDT -----  Contact: pt    ----- Message -----  From: Tina Velásquez  Sent: 6/15/2020   3:45 PM CDT  To: [facility] Pre-Kidney Transplant Clinical     Patient calling to speak coordinator about  Neuro clearance          Call Back : [phone]

## 2020-06-16 NOTE — TELEPHONE ENCOUNTER
----- Message from Keila Barney sent at 6/16/2020 11:09 AM CDT -----  Pt is calling to speak to coordinator    Pt contact [phone]

## 2020-07-10 NOTE — TELEPHONE ENCOUNTER
Call returned, left message on voicemail  ----- Message from Jon Coughlin sent at 7/10/2020  8:27 AM CDT -----  Regarding: Toshia Fontanez MS: Lanny  Calling to touch basis with coord about pt      [phone](O)

## 2020-07-20 NOTE — TELEPHONE ENCOUNTER
----- Message from Jon Coughlin sent at 7/20/2020  8:18 AM CDT -----  Regarding: [facility] MS: Lanny  Calling to speak regarding pt      [phone]

## 2020-08-12 NOTE — TELEPHONE ENCOUNTER
I called patient to follow-up on Neurology appointment that was scheduled yesterday. Patient stated he went to appointment, however, the doctor went on paternity leave and he is getting rescheduled. Patient seems very motivated to get listed and has a son that would like to donate. I explained to him that he had an echo done on 7/21/2020 with elevated PA pressure and he will need to repeat an echo after a HD treatment prior to listing. I unformed him that once he is acceptable to list his son can call the donor coordinator to initiate the donation process and see if he is a compatible match and be assessed. Patient given time to ask questions ans all questions answered. Patient stated that he will call me with ne Neurology appointment date and will have his cardiologist order repeat echo. He verbalized understanding of all information dicussed.

## 2020-08-17 NOTE — TELEPHONE ENCOUNTER
Unable to leave  due to mailbox full.  Attempted to call patient regarding review of chart with transplant team.

## 2020-08-18 ENCOUNTER — TELEPHONE (OUTPATIENT)
Dept: TRANSPLANT | Facility: CLINIC | Age: 55
End: 2020-08-18

## 2020-08-18 NOTE — TELEPHONE ENCOUNTER
I attempted to call patient's phone and his wife's phone. Both numbers have full mailboxes and I am unable to leave message.

## 2020-08-19 ENCOUNTER — POST MORTEM DOCUMENTATION (OUTPATIENT)
Dept: TRANSPLANT | Facility: CLINIC | Age: 55
End: 2020-08-19